# Patient Record
Sex: FEMALE | Race: WHITE | NOT HISPANIC OR LATINO
[De-identification: names, ages, dates, MRNs, and addresses within clinical notes are randomized per-mention and may not be internally consistent; named-entity substitution may affect disease eponyms.]

---

## 2018-10-02 PROBLEM — Z00.00 ENCOUNTER FOR PREVENTIVE HEALTH EXAMINATION: Status: ACTIVE | Noted: 2018-10-02

## 2018-10-03 ENCOUNTER — APPOINTMENT (OUTPATIENT)
Dept: GYNECOLOGIC ONCOLOGY | Facility: CLINIC | Age: 33
End: 2018-10-03
Payer: COMMERCIAL

## 2018-10-03 DIAGNOSIS — Z78.9 OTHER SPECIFIED HEALTH STATUS: ICD-10-CM

## 2018-10-03 DIAGNOSIS — J84.01 ALVEOLAR PROTEINOSIS: ICD-10-CM

## 2018-10-03 PROCEDURE — 93976 VASCULAR STUDY: CPT | Mod: 59

## 2018-10-03 PROCEDURE — 99245 OFF/OP CONSLTJ NEW/EST HI 55: CPT | Mod: 25

## 2018-10-03 PROCEDURE — 76830 TRANSVAGINAL US NON-OB: CPT | Mod: 59

## 2018-10-03 PROCEDURE — 76857 US EXAM PELVIC LIMITED: CPT | Mod: 59

## 2018-10-03 RX ORDER — NORETHINDRONE AND ETHINYL ESTRADIOL 0.4-0.035
KIT ORAL
Refills: 0 | Status: ACTIVE | COMMUNITY

## 2018-10-04 ENCOUNTER — OTHER (OUTPATIENT)
Age: 33
End: 2018-10-04

## 2018-10-07 ENCOUNTER — OUTPATIENT (OUTPATIENT)
Dept: OUTPATIENT SERVICES | Facility: HOSPITAL | Age: 33
LOS: 1 days | End: 2018-10-07
Payer: COMMERCIAL

## 2018-10-07 ENCOUNTER — FORM ENCOUNTER (OUTPATIENT)
Age: 33
End: 2018-10-07

## 2018-10-07 ENCOUNTER — APPOINTMENT (OUTPATIENT)
Dept: MRI IMAGING | Facility: CLINIC | Age: 33
End: 2018-10-07
Payer: COMMERCIAL

## 2018-10-07 DIAGNOSIS — N83.209 UNSPECIFIED OVARIAN CYST, UNSPECIFIED SIDE: ICD-10-CM

## 2018-10-08 PROCEDURE — 72197 MRI PELVIS W/O & W/DYE: CPT | Mod: 26

## 2018-10-08 PROCEDURE — 72197 MRI PELVIS W/O & W/DYE: CPT

## 2018-10-08 PROCEDURE — A9585: CPT

## 2018-10-17 ENCOUNTER — APPOINTMENT (OUTPATIENT)
Dept: GYNECOLOGIC ONCOLOGY | Facility: CLINIC | Age: 33
End: 2018-10-17
Payer: COMMERCIAL

## 2018-10-17 PROCEDURE — 99214 OFFICE O/P EST MOD 30 MIN: CPT

## 2018-11-06 ENCOUNTER — INPATIENT (INPATIENT)
Facility: HOSPITAL | Age: 33
LOS: 0 days | Discharge: HOME | DRG: 743 | End: 2018-11-06
Attending: OBSTETRICS & GYNECOLOGY | Admitting: OBSTETRICS & GYNECOLOGY
Payer: COMMERCIAL

## 2018-11-06 ENCOUNTER — TRANSCRIPTION ENCOUNTER (OUTPATIENT)
Age: 33
End: 2018-11-06

## 2018-11-06 ENCOUNTER — RESULT REVIEW (OUTPATIENT)
Age: 33
End: 2018-11-06

## 2018-11-06 VITALS
RESPIRATION RATE: 16 BRPM | DIASTOLIC BLOOD PRESSURE: 80 MMHG | OXYGEN SATURATION: 100 % | SYSTOLIC BLOOD PRESSURE: 111 MMHG | HEART RATE: 66 BPM

## 2018-11-06 VITALS — HEIGHT: 63 IN | WEIGHT: 134.92 LBS

## 2018-11-06 DIAGNOSIS — N83.201 UNSPECIFIED OVARIAN CYST, RIGHT SIDE: ICD-10-CM

## 2018-11-06 DIAGNOSIS — R10.2 PELVIC AND PERINEAL PAIN: ICD-10-CM

## 2018-11-06 LAB
ANION GAP SERPL CALC-SCNC: 11 MMOL/L — SIGNIFICANT CHANGE UP (ref 5–17)
APTT BLD: 30.9 SEC — SIGNIFICANT CHANGE UP (ref 27.5–36.3)
BLD GP AB SCN SERPL QL: SIGNIFICANT CHANGE UP
BUN SERPL-MCNC: 8 MG/DL — SIGNIFICANT CHANGE UP (ref 8–20)
CALCIUM SERPL-MCNC: 8.5 MG/DL — LOW (ref 8.6–10.2)
CHLORIDE SERPL-SCNC: 101 MMOL/L — SIGNIFICANT CHANGE UP (ref 98–107)
CO2 SERPL-SCNC: 25 MMOL/L — SIGNIFICANT CHANGE UP (ref 22–29)
CREAT SERPL-MCNC: 0.59 MG/DL — SIGNIFICANT CHANGE UP (ref 0.5–1.3)
GLUCOSE SERPL-MCNC: 82 MG/DL — SIGNIFICANT CHANGE UP (ref 70–115)
HCG SERPL-ACNC: <4 MIU/ML — SIGNIFICANT CHANGE UP
HCT VFR BLD CALC: 42.7 % — SIGNIFICANT CHANGE UP (ref 37–47)
HGB BLD-MCNC: 14.2 G/DL — SIGNIFICANT CHANGE UP (ref 12–16)
INR BLD: 0.98 RATIO — SIGNIFICANT CHANGE UP (ref 0.88–1.16)
MCHC RBC-ENTMCNC: 31.1 PG — HIGH (ref 27–31)
MCHC RBC-ENTMCNC: 33.3 G/DL — SIGNIFICANT CHANGE UP (ref 32–36)
MCV RBC AUTO: 93.4 FL — SIGNIFICANT CHANGE UP (ref 81–99)
PLATELET # BLD AUTO: 257 K/UL — SIGNIFICANT CHANGE UP (ref 150–400)
POTASSIUM SERPL-MCNC: 4 MMOL/L — SIGNIFICANT CHANGE UP (ref 3.5–5.3)
POTASSIUM SERPL-SCNC: 4 MMOL/L — SIGNIFICANT CHANGE UP (ref 3.5–5.3)
PROTHROM AB SERPL-ACNC: 11.3 SEC — SIGNIFICANT CHANGE UP (ref 10–12.9)
RBC # BLD: 4.57 M/UL — SIGNIFICANT CHANGE UP (ref 4.4–5.2)
RBC # FLD: 13.5 % — SIGNIFICANT CHANGE UP (ref 11–15.6)
SODIUM SERPL-SCNC: 137 MMOL/L — SIGNIFICANT CHANGE UP (ref 135–145)
TYPE + AB SCN PNL BLD: SIGNIFICANT CHANGE UP
WBC # BLD: 6.6 K/UL — SIGNIFICANT CHANGE UP (ref 4.8–10.8)
WBC # FLD AUTO: 6.6 K/UL — SIGNIFICANT CHANGE UP (ref 4.8–10.8)

## 2018-11-06 PROCEDURE — 85730 THROMBOPLASTIN TIME PARTIAL: CPT

## 2018-11-06 PROCEDURE — 84702 CHORIONIC GONADOTROPIN TEST: CPT

## 2018-11-06 PROCEDURE — 58662 LAPAROSCOPY EXCISE LESIONS: CPT | Mod: 80

## 2018-11-06 PROCEDURE — 86901 BLOOD TYPING SEROLOGIC RH(D): CPT

## 2018-11-06 PROCEDURE — 88307 TISSUE EXAM BY PATHOLOGIST: CPT | Mod: 26

## 2018-11-06 PROCEDURE — 58662 LAPAROSCOPY EXCISE LESIONS: CPT

## 2018-11-06 PROCEDURE — 86900 BLOOD TYPING SEROLOGIC ABO: CPT

## 2018-11-06 PROCEDURE — 85610 PROTHROMBIN TIME: CPT

## 2018-11-06 PROCEDURE — 99284 EMERGENCY DEPT VISIT MOD MDM: CPT | Mod: 25

## 2018-11-06 PROCEDURE — 80048 BASIC METABOLIC PNL TOTAL CA: CPT

## 2018-11-06 PROCEDURE — 85027 COMPLETE CBC AUTOMATED: CPT

## 2018-11-06 PROCEDURE — 86850 RBC ANTIBODY SCREEN: CPT

## 2018-11-06 PROCEDURE — 99285 EMERGENCY DEPT VISIT HI MDM: CPT

## 2018-11-06 PROCEDURE — 36415 COLL VENOUS BLD VENIPUNCTURE: CPT

## 2018-11-06 RX ORDER — CEFAZOLIN SODIUM 1 G
2000 VIAL (EA) INJECTION ONCE
Qty: 0 | Refills: 0 | Status: DISCONTINUED | OUTPATIENT
Start: 2018-11-06 | End: 2018-11-06

## 2018-11-06 RX ORDER — OXYCODONE AND ACETAMINOPHEN 5; 325 MG/1; MG/1
1 TABLET ORAL EVERY 4 HOURS
Qty: 0 | Refills: 0 | Status: DISCONTINUED | OUTPATIENT
Start: 2018-11-06 | End: 2018-11-06

## 2018-11-06 RX ORDER — SODIUM CHLORIDE 9 MG/ML
1000 INJECTION, SOLUTION INTRAVENOUS
Qty: 0 | Refills: 0 | Status: DISCONTINUED | OUTPATIENT
Start: 2018-11-06 | End: 2018-11-06

## 2018-11-06 RX ORDER — FENTANYL CITRATE 50 UG/ML
50 INJECTION INTRAVENOUS
Qty: 0 | Refills: 0 | Status: DISCONTINUED | OUTPATIENT
Start: 2018-11-06 | End: 2018-11-06

## 2018-11-06 RX ORDER — ONDANSETRON 8 MG/1
4 TABLET, FILM COATED ORAL ONCE
Qty: 0 | Refills: 0 | Status: DISCONTINUED | OUTPATIENT
Start: 2018-11-06 | End: 2018-11-06

## 2018-11-06 RX ADMIN — OXYCODONE AND ACETAMINOPHEN 1 TABLET(S): 5; 325 TABLET ORAL at 14:33

## 2018-11-06 RX ADMIN — FENTANYL CITRATE 50 MICROGRAM(S): 50 INJECTION INTRAVENOUS at 14:33

## 2018-11-06 RX ADMIN — SODIUM CHLORIDE 125 MILLILITER(S): 9 INJECTION, SOLUTION INTRAVENOUS at 09:19

## 2018-11-06 RX ADMIN — FENTANYL CITRATE 50 MICROGRAM(S): 50 INJECTION INTRAVENOUS at 13:43

## 2018-11-06 NOTE — H&P ADULT - HISTORY OF PRESENT ILLNESS
34 yo known to our office for ovarian cysts found on MRI one of which is suspicious for a dermoid. Patient presents to ED today with c/o new onset pelvic pain 10/10. Patient reports pain is severe and sharp in nature and began early this morning requiring visit to ER. Patient denies any radiation. She reports similar pain a few weeks ago but states the pain became unbearable today. Patient denies any fevers, SOB, CP, N/V/D, difficulties with urination, VB and calf pain.

## 2018-11-06 NOTE — H&P ADULT - NSHPLABSRESULTS_GEN_ALL_CORE
OBJECTIVE FINDINGS:    Vital Signs Last 24 Hrs  T(C): 36.9 (06 Nov 2018 06:47), Max: 36.9 (06 Nov 2018 06:47)  T(F): 98.4 (06 Nov 2018 06:47), Max: 98.4 (06 Nov 2018 06:47)  HR: 97 (06 Nov 2018 06:47) (97 - 97)  BP: 132/86 (06 Nov 2018 06:47) (132/86 - 132/86)  BP(mean): --  RR: 18 (06 Nov 2018 06:47) (18 - 18)  SpO2: 98% (06 Nov 2018 06:47) (98% - 98%)

## 2018-11-06 NOTE — H&P ADULT - NSHPREVIEWOFSYSTEMS_GEN_ALL_CORE
REVIEW OF SYSTEMS:    CONSTITUTIONAL: No fever, weight loss, or fatigue  RESPIRATORY: No cough, wheezing, chills or hemoptysis; No shortness of breath  CARDIOVASCULAR: No chest pain, palpitations, dizziness, or leg swelling  GASTROINTESTINAL: No abdominal or epigastric pain. No nausea, vomiting, or hematemesis; No diarrhea or constipation. No melena or hematochezia.  GENITOURINARY: Pelvic pain, No dysuria, frequency, hematuria, or incontinence  NEUROLOGICAL: No headaches, memory loss, loss of strength, numbness, or tremors  SKIN: No itching, burning, rashes, or lesions   LYMPH NODES: No enlarged glands  MUSCULOSKELETAL: No joint pain or swelling; No muscle, back, or extremity pain

## 2018-11-06 NOTE — ED PROVIDER NOTE - PHYSICAL EXAMINATION
Gen: NAD, AOx3  Head: NCAT  HEENT: PERRL, EOMI, oral mucosa moist, normal conjunctiva, neck supple  Lung: CTAB, no respiratory distress  CV: rrr, no murmur, Normal perfusion  Abd: soft, +ttp left lower quadrant without rebound/guarding, ND  MSK: No edema, no visible deformities  Neuro: No focal neurologic deficits  Skin: No rash   Psych: normal affect

## 2018-11-06 NOTE — DISCHARGE NOTE ADULT - PATIENT PORTAL LINK FT
You can access the [facility] Patient Portal, offered by [facility], by registering with the following website: [email or web address]

## 2018-11-06 NOTE — DISCHARGE NOTE ADULT - CARE PLAN
Principal Discharge DX:	Cysts of both ovaries  Goal:	improve pain  Assessment and plan of treatment:	will follow-up in office to review path report.

## 2018-11-06 NOTE — H&P ADULT - PROBLEM SELECTOR PLAN 1
Admit to GYN/ONC for OR  Laparoscopic bilateral ovarian cystectomy  NPO for procedure  Percocet and naproxen prn for pain control  IV insert, IVF  stat labs, serum hcg  SCD's for DVT prophylaxis  Ancef 2g pre-op  Plan per Dr. Petit

## 2018-11-06 NOTE — ED PROVIDER NOTE - OBJECTIVE STATEMENT
32yo F with h/o ovarian cysts and 'ovarian sx' in past with known b/l cysts Lt larger than rt, worsening pain since last night. no fever +chills. no dysuria. pain intermttient. 7/10 at [facility]. nonradiating. pain now left lower quadrant and mild rt flank pain.

## 2018-11-06 NOTE — ED PROVIDER NOTE - NS ED ROS FT
ROS: no CP/SOB. no cough. no fever. no n/v/d/c. no abd pain. no rash. no bleeding. no urinary complaints. no weakness. no vision changes. no HA. +back pain. no extremity swelling/deformity. +change in mental status.

## 2018-11-06 NOTE — H&P ADULT - NSHPPHYSICALEXAM_GEN_ALL_CORE
PHYSICAL EXAM:    GENERAL: NAD, well-developed  NERVOUS SYSTEM:  Alert & Oriented X3, Good concentration  CHEST/LUNG: Clear to percussion bilaterally; No rales, rhonchi, wheezing, or rubs  HEART: Regular rate and rhythm; No murmurs, rubs, or gallops  ABDOMEN: Tender to light palpation in all four quadrants, Soft, Nondistended; Bowel sounds present, No rebound, No guarding  EXTREMITIES:  2+ Peripheral Pulses, No clubbing, cyanosis, or edema, Justice's sign negative  LYMPH: No lymphadenopathy noted  SKIN: No rashes or lesions  PELVIC: Deferred

## 2018-11-06 NOTE — DISCHARGE NOTE ADULT - HOSPITAL COURSE
34yo known to our service for ovarian cysts presented with severe pelvic pain. Patient is now s/p laparoscopic bilateral ovarian cystectomy. She will be discharged home per ASU criteria with follow-up in the office. Scripts for pain contorl sent. VS stable for discharge.

## 2018-11-06 NOTE — DISCHARGE NOTE ADULT - MEDICATION SUMMARY - MEDICATIONS TO TAKE
I will START or STAY ON the medications listed below when I get home from the hospital:    oxyCODONE-acetaminophen 5 mg-325 mg oral tablet  -- 1 tab(s) by mouth every 6 hours, As Needed -Moderate Pain (4 - 6) - for severe pain MDD:4 tablets  -- Indication: For Pelvic pain    naproxen 500 mg oral tablet  -- 1 tab(s) by mouth 2 times a day  -- Indication: For inflammation pain

## 2018-11-06 NOTE — ED ADULT NURSE NOTE - OBJECTIVE STATEMENT
c/o abd pain, per pt having ovarian pain on and off, this morning it was unbearable.  Dr. Petit sent me here.n

## 2018-11-08 LAB — SURGICAL PATHOLOGY FINAL REPORT - CH: SIGNIFICANT CHANGE UP

## 2018-11-21 ENCOUNTER — APPOINTMENT (OUTPATIENT)
Dept: GYNECOLOGIC ONCOLOGY | Facility: CLINIC | Age: 33
End: 2018-11-21
Payer: COMMERCIAL

## 2018-11-21 VITALS
WEIGHT: 139 LBS | RESPIRATION RATE: 16 BRPM | BODY MASS INDEX: 24.63 KG/M2 | HEIGHT: 63 IN | DIASTOLIC BLOOD PRESSURE: 70 MMHG | SYSTOLIC BLOOD PRESSURE: 124 MMHG | HEART RATE: 91 BPM | OXYGEN SATURATION: 96 %

## 2018-11-21 PROCEDURE — 99024 POSTOP FOLLOW-UP VISIT: CPT

## 2019-06-21 ENCOUNTER — APPOINTMENT (OUTPATIENT)
Dept: GYNECOLOGIC ONCOLOGY | Facility: CLINIC | Age: 34
End: 2019-06-21

## 2019-07-22 ENCOUNTER — APPOINTMENT (OUTPATIENT)
Dept: GYNECOLOGIC ONCOLOGY | Facility: CLINIC | Age: 34
End: 2019-07-22
Payer: COMMERCIAL

## 2019-07-22 PROCEDURE — 99214 OFFICE O/P EST MOD 30 MIN: CPT | Mod: 25

## 2019-07-22 PROCEDURE — 76857 US EXAM PELVIC LIMITED: CPT | Mod: 59

## 2019-07-22 PROCEDURE — 76830 TRANSVAGINAL US NON-OB: CPT | Mod: 59

## 2019-07-23 ENCOUNTER — OTHER (OUTPATIENT)
Age: 34
End: 2019-07-23

## 2019-07-25 ENCOUNTER — OTHER (OUTPATIENT)
Age: 34
End: 2019-07-25

## 2019-07-30 NOTE — HISTORY OF PRESENT ILLNESS
[FreeTextEntry1] : 33 y/o with history of recurrent dermoids, last resection 11/2018 by me for bilateral dermoids was discharged from my practice. She returns today after recently seeing Dr. Ruth her gyn and having a f/u ultrasound. US from 6/17 showed a left septated cyst measuring 8.0x4.4x6.0cm, when compared to 05/2019 appears to have grown (5/2019-6.5x4.5x7.0cm). She returns today for an US and to discuss management.

## 2019-07-30 NOTE — ASSESSMENT
[FreeTextEntry1] : Ultrasound today showed an 8 cm left septated cyst \par \par I discussed the possibility of requiring a repeat resection if MRI shows this is a dermoid.

## 2019-07-30 NOTE — REASON FOR VISIT
[FreeTextEntry1] : [facility]\par \par [facility] [phone] at [address] [place] [id number]\par

## 2019-07-30 NOTE — PHYSICAL EXAM
[Normal] : No focal neurologic defects observed [de-identified] : Kylee Mccullough MA was present the entire time of results and discussion

## 2019-07-30 NOTE — END OF VISIT
[FreeTextEntry3] : Written by Kylee Mccullough, acting as a scribe for Dr. Brody Petit.\par This note accurately reflects the work and decision made by me.\par

## 2019-08-10 ENCOUNTER — APPOINTMENT (OUTPATIENT)
Dept: GYNECOLOGIC ONCOLOGY | Facility: CLINIC | Age: 34
End: 2019-08-10
Payer: COMMERCIAL

## 2019-08-10 PROCEDURE — 99214 OFFICE O/P EST MOD 30 MIN: CPT

## 2019-08-13 NOTE — PHYSICAL EXAM
[Normal] : No focal neurologic defects observed [de-identified] : Kylee Mccullough MA was present the entire time of results and discussion

## 2019-08-13 NOTE — ASSESSMENT
[FreeTextEntry1] : Discussed with patient that the cyst does not appear to have obvious dermoid appearance on MRI. I would like to observe for now. If cyst continues to grow then will discuss surgical intervention.

## 2019-08-13 NOTE — HISTORY OF PRESENT ILLNESS
[FreeTextEntry1] : 33 y/o with history of recurrent dermoids, last resection 11/2018 by me for bilateral dermoids was discharged from my practice. She was referred back to my office by Dr. Ruth for evaluation of  left septated cyst. I ordered a MRI and asked her to return today to review. \par \par MRI from 8/3 showed a large mildly complex cyst in the left adnexa measuring 7.8 x 6 cm on transaxial imaging by 6.1 cm in craniocaudal dimensions within the internal septa but no nodularity. \par \par Patient states she is symptomatic, but is not in terrible pain or discomfort. Her quality of life is not being altered.

## 2019-08-13 NOTE — REASON FOR VISIT
[FreeTextEntry1] : [facility]\par \par [facility] [phone] at [address] [place] [phone]\par

## 2019-10-03 ENCOUNTER — APPOINTMENT (OUTPATIENT)
Dept: GYNECOLOGIC ONCOLOGY | Facility: CLINIC | Age: 34
End: 2019-10-03
Payer: COMMERCIAL

## 2019-10-03 VITALS
HEIGHT: 63 IN | BODY MASS INDEX: 24.27 KG/M2 | RESPIRATION RATE: 16 BRPM | WEIGHT: 137 LBS | HEART RATE: 75 BPM | OXYGEN SATURATION: 98 %

## 2019-10-03 PROCEDURE — 76830 TRANSVAGINAL US NON-OB: CPT | Mod: 59

## 2019-10-03 PROCEDURE — 99214 OFFICE O/P EST MOD 30 MIN: CPT | Mod: 25

## 2019-10-03 PROCEDURE — 76857 US EXAM PELVIC LIMITED: CPT | Mod: 59

## 2019-10-21 NOTE — PHYSICAL EXAM
[Normal] : No focal neurologic defects observed [de-identified] : Kylee Mccullough MA was present the entire time of ultrasound results and discussion

## 2019-10-21 NOTE — HISTORY OF PRESENT ILLNESS
[FreeTextEntry1] : 33 y/o with a history of recurrent dermoid, requiring prior resection in 11/2018 returns today for a follow up ultrasound. Patient’s last MRI from 8/3/19 revealed a large mildly complex cyst in the left adnexa measuring 7.8x6cm.  Patient admits to persistent pelvic pain, bilaterally.

## 2019-10-21 NOTE — END OF VISIT
[FreeTextEntry3] : Written by Kylee Mccullough, acting as a scribe for Dr. Brody Petit.\par This note accurately reflects the work and decisions made by me\par 
cough

## 2019-10-21 NOTE — ASSESSMENT
[FreeTextEntry1] : Ultrasound from today showed a stable left cyst measuring 8 cm.\par \par I discussed at length with the patient the nature, purpose, risks, benefits, and alternatives of laparoscopic left ovarian cystectomy..  The patient understands the risks to include (but not be limited to) bowel injury, bleeding (with the possible need for transfusion), bladder or ureteral injury, infections, deep venous thrombosis, and kallie-operative death.  The patient also understands that her surgery may not be able to be performed laparoscopically and that she may need a laparotomy.  She also understands the limitations of laparoscopic surgery and the possibility of missing a surgical complication with need for subsequent re-exploration.   She understands the need for a pre-operative bowel preparation and agrees to comply with our instructions.  She agrees to proceed.  She asked numerous questions which were answered to her satisfaction.

## 2019-10-21 NOTE — REASON FOR VISIT
[FreeTextEntry1] : [facility]\par \par [facility] [phone] at [address] [place] [phone]\par

## 2019-11-06 ENCOUNTER — OUTPATIENT (OUTPATIENT)
Dept: OUTPATIENT SERVICES | Facility: HOSPITAL | Age: 34
LOS: 1 days | End: 2019-11-06
Payer: COMMERCIAL

## 2019-11-06 VITALS
HEART RATE: 88 BPM | DIASTOLIC BLOOD PRESSURE: 79 MMHG | WEIGHT: 143.3 LBS | RESPIRATION RATE: 16 BRPM | TEMPERATURE: 98 F | HEIGHT: 63 IN | SYSTOLIC BLOOD PRESSURE: 110 MMHG

## 2019-11-06 DIAGNOSIS — Z29.9 ENCOUNTER FOR PROPHYLACTIC MEASURES, UNSPECIFIED: ICD-10-CM

## 2019-11-06 DIAGNOSIS — N83.209 UNSPECIFIED OVARIAN CYST, UNSPECIFIED SIDE: ICD-10-CM

## 2019-11-06 DIAGNOSIS — Z98.890 OTHER SPECIFIED POSTPROCEDURAL STATES: Chronic | ICD-10-CM

## 2019-11-06 DIAGNOSIS — Z01.818 ENCOUNTER FOR OTHER PREPROCEDURAL EXAMINATION: ICD-10-CM

## 2019-11-06 LAB
ANION GAP SERPL CALC-SCNC: 13 MMOL/L — SIGNIFICANT CHANGE UP (ref 5–17)
BASOPHILS # BLD AUTO: 0.03 K/UL — SIGNIFICANT CHANGE UP (ref 0–0.2)
BASOPHILS NFR BLD AUTO: 0.6 % — SIGNIFICANT CHANGE UP (ref 0–2)
BUN SERPL-MCNC: 7 MG/DL — LOW (ref 8–20)
CALCIUM SERPL-MCNC: 8.7 MG/DL — SIGNIFICANT CHANGE UP (ref 8.6–10.2)
CHLORIDE SERPL-SCNC: 105 MMOL/L — SIGNIFICANT CHANGE UP (ref 98–107)
CO2 SERPL-SCNC: 22 MMOL/L — SIGNIFICANT CHANGE UP (ref 22–29)
CREAT SERPL-MCNC: 0.57 MG/DL — SIGNIFICANT CHANGE UP (ref 0.5–1.3)
EOSINOPHIL # BLD AUTO: 0.12 K/UL — SIGNIFICANT CHANGE UP (ref 0–0.5)
EOSINOPHIL NFR BLD AUTO: 2.3 % — SIGNIFICANT CHANGE UP (ref 0–6)
GLUCOSE SERPL-MCNC: 98 MG/DL — SIGNIFICANT CHANGE UP (ref 70–115)
HBA1C BLD-MCNC: 5.2 % — SIGNIFICANT CHANGE UP (ref 4–5.6)
HCG SERPL-ACNC: <4 MIU/ML — SIGNIFICANT CHANGE UP
HCT VFR BLD CALC: 43.5 % — SIGNIFICANT CHANGE UP (ref 34.5–45)
HGB BLD-MCNC: 14.5 G/DL — SIGNIFICANT CHANGE UP (ref 11.5–15.5)
IMM GRANULOCYTES NFR BLD AUTO: 0.8 % — SIGNIFICANT CHANGE UP (ref 0–1.5)
LYMPHOCYTES # BLD AUTO: 1.87 K/UL — SIGNIFICANT CHANGE UP (ref 1–3.3)
LYMPHOCYTES # BLD AUTO: 35.2 % — SIGNIFICANT CHANGE UP (ref 13–44)
MCHC RBC-ENTMCNC: 31 PG — SIGNIFICANT CHANGE UP (ref 27–34)
MCHC RBC-ENTMCNC: 33.3 GM/DL — SIGNIFICANT CHANGE UP (ref 32–36)
MCV RBC AUTO: 92.9 FL — SIGNIFICANT CHANGE UP (ref 80–100)
MONOCYTES # BLD AUTO: 0.41 K/UL — SIGNIFICANT CHANGE UP (ref 0–0.9)
MONOCYTES NFR BLD AUTO: 7.7 % — SIGNIFICANT CHANGE UP (ref 2–14)
NEUTROPHILS # BLD AUTO: 2.85 K/UL — SIGNIFICANT CHANGE UP (ref 1.8–7.4)
NEUTROPHILS NFR BLD AUTO: 53.4 % — SIGNIFICANT CHANGE UP (ref 43–77)
PLATELET # BLD AUTO: 305 K/UL — SIGNIFICANT CHANGE UP (ref 150–400)
POTASSIUM SERPL-MCNC: 4 MMOL/L — SIGNIFICANT CHANGE UP (ref 3.5–5.3)
POTASSIUM SERPL-SCNC: 4 MMOL/L — SIGNIFICANT CHANGE UP (ref 3.5–5.3)
RBC # BLD: 4.68 M/UL — SIGNIFICANT CHANGE UP (ref 3.8–5.2)
RBC # FLD: 13 % — SIGNIFICANT CHANGE UP (ref 10.3–14.5)
SODIUM SERPL-SCNC: 140 MMOL/L — SIGNIFICANT CHANGE UP (ref 135–145)
WBC # BLD: 5.32 K/UL — SIGNIFICANT CHANGE UP (ref 3.8–10.5)
WBC # FLD AUTO: 5.32 K/UL — SIGNIFICANT CHANGE UP (ref 3.8–10.5)

## 2019-11-06 PROCEDURE — G0463: CPT

## 2019-11-06 PROCEDURE — 83036 HEMOGLOBIN GLYCOSYLATED A1C: CPT

## 2019-11-06 PROCEDURE — 80048 BASIC METABOLIC PNL TOTAL CA: CPT

## 2019-11-06 PROCEDURE — 84702 CHORIONIC GONADOTROPIN TEST: CPT

## 2019-11-06 PROCEDURE — 85027 COMPLETE CBC AUTOMATED: CPT

## 2019-11-06 PROCEDURE — 36415 COLL VENOUS BLD VENIPUNCTURE: CPT

## 2019-11-06 NOTE — H&P PST ADULT - ASSESSMENT
34 year old female in NAD with history of ovarian cysts presents for removal ov left ovarian cyst .   OPIOID RISK TOOL    CORINA EACH BOX THAT APPLIES AND ADD TOTALS AT THE END    FAMILY HISTORY OF SUBSTANCE ABUSE                 FEMALE         MALE                                                Alcohol                             [  ]1 pt          [  ]3pts                                               Illegal Durgs                     [  ]2 pts        [  ]3pts                                               Rx Drugs                           [  ]4 pts        [  ]4 pts    PERSONAL HISTORY OF SUBSTANCE ABUSE                                                                                          Alcohol                             [  ]3 pts       [  ]3 pts                                               Illegal Durgs                     [  ]X4 pts        [  ]4 pts                                               Rx Drugs                           [  ]5 pts        [  ]5 pts    AGE BETWEEN 16-45 YEARS                                      [X  ]1 pt         [  ]1 pt    HISTORY OF PREADOLESCENT   SEXUAL ABUSE                                                             [  ]3 pts        [  ]0pts    PSYCHOLOGICAL DISEASE                     ADD, OCD, Bipolar, Schizophrenia        [  ]2 pts         [  ]2 pts                      Depression                                               [  ]1 pt           [  ]1 pt           SCORING TOTAL   (add numbers and type here)              (*5**)                                     A score of 3 or lower indicated LOW risk for future opiod abuse  A score of 4 to 7 indicated moderate risk for future opiod abuse  A score of 8 or higher indicates a high risk for opiod abuse  CAPRINI VTE 2.0 SCORE [CLOT updated 2019]    AGE RELATED RISK FACTORS                                                       MOBILITY RELATED FACTORS  [ ] Age 41-60 years                                            (1 Point)                    [ ] Bed rest                                                        (1 Point)  [ ] Age: 61-74 years                                           (2 Points)                  [ ] Plaster cast                                                   (2 Points)  [ ] Age= 75 years                                              (3 Points)                    [ ] Bed bound for more than 72 hours                 (2 Points)    DISEASE RELATED RISK FACTORS                                               GENDER SPECIFIC FACTORS  [ ] Edema in the lower extremities                       (1 Point)              [ ] Pregnancy                                                     (1 Point)  [ ] Varicose veins                                               (1 Point)                     [ ] Post-partum < 6 weeks                                   (1 Point)             [ ] BMI > 25 Kg/m2                                            (1 Point)                     [ ] Hormonal therapy  or oral contraception          (1 Point)                 [ ] Sepsis (in the previous month)                        (1 Point)               [ ] History of pregnancy complications                 (1 point)  [ ] Pneumonia or serious lung disease                                               [ ] Unexplained or recurrent                     (1 Point)           (in the previous month)                               (1 Point)  [ ] Abnormal pulmonary function test                     (1 Point)                 SURGERY RELATED RISK FACTORS  [ ] Acute myocardial infarction                              (1 Point)               [ ]  Section                                             (1 Point)  [ ] Congestive heart failure (in the previous month)  (1 Point)      [ ] Minor surgery                                                  (1 Point)   [ ] Inflammatory bowel disease                             (1 Point)               [ ] Arthroscopic surgery                                        (2 Points)  [ ] Central venous access                                      (2 Points)                [ X] General surgery lasting more than 45 minutes (2 points)  [ ] Malignancy- Present or previous                   (2 Points)                [ ] Elective arthroplasty                                         (5 points)    [ ] Stroke (in the previous month)                          (5 Points)                                                                                                                                                           HEMATOLOGY RELATED FACTORS                                                 TRAUMA RELATED RISK FACTORS  [ ] Prior episodes of VTE                                     (3 Points)                [ ] Fracture of the hip, pelvis, or leg                       (5 Points)  [ ] Positive family history for VTE                         (3 Points)             [ ] Acute spinal cord injury (in the previous month)  (5 Points)  [ ] Prothrombin 32718 A                                     (3 Points)               [ ] Paralysis  (less than 1 month)                             (5 Points)  [ ] Factor V Leiden                                             (3 Points)                  [ ] Multiple Trauma within 1 month                        (5 Points)  [ ] Lupus anticoagulants                                     (3 Points)                                                           [ ] Anticardiolipin antibodies                               (3 Points)                                                       [ ] High homocysteine in the blood                      (3 Points)                                             [ ] Other congenital or acquired thrombophilia      (3 Points)                                                [ ] Heparin induced thrombocytopenia                  (3 Points)                                     Total Score [      2    ]

## 2019-11-06 NOTE — H&P PST ADULT - HISTORY OF PRESENT ILLNESS
34 yr old female in NAD presents with  history of ovarian cysts . Pt had surgery   and 2018 to remove cysts. Pt has left lower abdominal pain that radiates to right .8  . Left cyst has become larger over past few months.  LMP 19 . Started BCP  in 2018 s/p surgery.

## 2019-11-06 NOTE — H&P PST ADULT - NSICDXPROBLEM_GEN_ALL_CORE_FT
PROBLEM DIAGNOSES  Problem: Need for prophylactic measure  Assessment and Plan: caprinin score 2     Problem: Ovarian cyst  Assessment and Plan: laparoscopic  left ovarian  cystectomy  with DR. Jose tuttle

## 2019-11-06 NOTE — H&P PST ADULT - NSICDXPASTSURGICALHX_GEN_ALL_CORE_FT
PAST SURGICAL HISTORY:  H/O ovarian cystectomy 2009,2018    History of augmentation of both breasts implants

## 2019-11-06 NOTE — H&P PST ADULT - NSANTHOSAYNRD_GEN_A_CORE
No. LUIS E screening performed.  STOP BANG Legend: 0-2 = LOW Risk; 3-4 = INTERMEDIATE Risk; 5-8 = HIGH Risk

## 2019-11-11 ENCOUNTER — FORM ENCOUNTER (OUTPATIENT)
Age: 34
End: 2019-11-11

## 2019-11-12 ENCOUNTER — RESULT REVIEW (OUTPATIENT)
Age: 34
End: 2019-11-12

## 2019-11-12 ENCOUNTER — OUTPATIENT (OUTPATIENT)
Dept: OUTPATIENT SERVICES | Facility: HOSPITAL | Age: 34
LOS: 1 days | End: 2019-11-12
Payer: COMMERCIAL

## 2019-11-12 VITALS
DIASTOLIC BLOOD PRESSURE: 81 MMHG | SYSTOLIC BLOOD PRESSURE: 118 MMHG | TEMPERATURE: 99 F | RESPIRATION RATE: 20 BRPM | HEART RATE: 88 BPM | OXYGEN SATURATION: 97 %

## 2019-11-12 VITALS — HEIGHT: 63 IN | WEIGHT: 138.89 LBS

## 2019-11-12 DIAGNOSIS — Z98.890 OTHER SPECIFIED POSTPROCEDURAL STATES: Chronic | ICD-10-CM

## 2019-11-12 DIAGNOSIS — N83.299 OTHER OVARIAN CYST, UNSPECIFIED SIDE: ICD-10-CM

## 2019-11-12 PROCEDURE — 88305 TISSUE EXAM BY PATHOLOGIST: CPT | Mod: 26

## 2019-11-12 PROCEDURE — 58662 LAPAROSCOPY EXCISE LESIONS: CPT

## 2019-11-12 PROCEDURE — 58662 LAPAROSCOPY EXCISE LESIONS: CPT | Mod: LT

## 2019-11-12 PROCEDURE — 88305 TISSUE EXAM BY PATHOLOGIST: CPT

## 2019-11-12 RX ORDER — NORETHINDRONE AND ETHINYL ESTRADIOL 0.4-0.035
1 KIT ORAL
Qty: 0 | Refills: 0 | DISCHARGE

## 2019-11-12 RX ORDER — SODIUM CHLORIDE 9 MG/ML
1000 INJECTION, SOLUTION INTRAVENOUS
Refills: 0 | Status: DISCONTINUED | OUTPATIENT
Start: 2019-11-12 | End: 2019-11-12

## 2019-11-12 RX ORDER — ACETAMINOPHEN 500 MG
2 TABLET ORAL
Qty: 0 | Refills: 0 | DISCHARGE

## 2019-11-12 RX ORDER — SODIUM CHLORIDE 9 MG/ML
3 INJECTION INTRAMUSCULAR; INTRAVENOUS; SUBCUTANEOUS ONCE
Refills: 0 | Status: DISCONTINUED | OUTPATIENT
Start: 2019-11-12 | End: 2019-11-12

## 2019-11-12 RX ORDER — FENTANYL CITRATE 50 UG/ML
50 INJECTION INTRAVENOUS
Refills: 0 | Status: DISCONTINUED | OUTPATIENT
Start: 2019-11-12 | End: 2019-11-12

## 2019-11-12 RX ORDER — ONDANSETRON 8 MG/1
4 TABLET, FILM COATED ORAL ONCE
Refills: 0 | Status: DISCONTINUED | OUTPATIENT
Start: 2019-11-12 | End: 2019-11-12

## 2019-11-12 RX ORDER — CEFAZOLIN SODIUM 1 G
2000 VIAL (EA) INJECTION ONCE
Refills: 0 | Status: DISCONTINUED | OUTPATIENT
Start: 2019-11-12 | End: 2019-11-12

## 2019-11-12 RX ORDER — CEFAZOLIN SODIUM 1 G
2000 VIAL (EA) INJECTION ONCE
Refills: 0 | Status: DISCONTINUED | OUTPATIENT
Start: 2019-11-12 | End: 2019-11-28

## 2019-11-12 RX ORDER — CEFOTETAN DISODIUM 1 G
2 VIAL (EA) INJECTION ONCE
Refills: 0 | Status: DISCONTINUED | OUTPATIENT
Start: 2019-11-12 | End: 2019-11-12

## 2019-11-12 RX ORDER — OXYCODONE HYDROCHLORIDE 5 MG/1
1 TABLET ORAL
Qty: 12 | Refills: 0
Start: 2019-11-12 | End: 2019-11-14

## 2019-11-12 RX ADMIN — FENTANYL CITRATE 50 MICROGRAM(S): 50 INJECTION INTRAVENOUS at 11:35

## 2019-11-12 RX ADMIN — FENTANYL CITRATE 50 MICROGRAM(S): 50 INJECTION INTRAVENOUS at 11:21

## 2019-11-12 RX ADMIN — FENTANYL CITRATE 50 MICROGRAM(S): 50 INJECTION INTRAVENOUS at 11:15

## 2019-11-12 RX ADMIN — FENTANYL CITRATE 50 MICROGRAM(S): 50 INJECTION INTRAVENOUS at 11:26

## 2019-11-12 NOTE — BRIEF OPERATIVE NOTE - NSICDXBRIEFPOSTOP_GEN_ALL_CORE_FT
POST-OP DIAGNOSIS:  Endometriosis 12-Nov-2019 11:04:09  Brody Livingston  Cyst, ovary, dermoid, left 12-Nov-2019 11:03:59  Brody Livingston

## 2019-11-12 NOTE — BRIEF OPERATIVE NOTE - OPERATION/FINDINGS
Left ovarian cyst approx 8cm, mostly simple cyst, dermoid 1cm. Right endometrial implant in pouch of vida

## 2019-11-12 NOTE — BRIEF OPERATIVE NOTE - NSICDXBRIEFPROCEDURE_GEN_ALL_CORE_FT
PROCEDURES:  Fulguration of endometriosis 12-Nov-2019 11:03:05  rBody Livingston  Laparoscopic left ovarian cystectomy 12-Nov-2019 11:02:55  Brody Livingston

## 2019-11-12 NOTE — ASU DISCHARGE PLAN (ADULT/PEDIATRIC) - ASU DC SPECIAL INSTRUCTIONSFT
Please Follow-up with Dr. Petit in two weeks to review pathology report/ for post-op visit.    Please take naproxen-1 tablet every 12 hours x 3-4 days, may take percocet as prescribed for breakthrough pain.

## 2019-11-12 NOTE — ASU DISCHARGE PLAN (ADULT/PEDIATRIC) - CARE PROVIDER_API CALL
Brody Petit)  Gynecologic Oncology; Obstetrics and Gynecology  [address] [place] [address]  Phone: [phone]  Fax: [phone]  Follow Up Time:

## 2019-11-14 PROBLEM — N83.209 UNSPECIFIED OVARIAN CYST, UNSPECIFIED SIDE: Chronic | Status: ACTIVE | Noted: 2019-11-06

## 2019-11-18 ENCOUNTER — OTHER (OUTPATIENT)
Age: 34
End: 2019-11-18

## 2019-11-18 LAB — SURGICAL PATHOLOGY STUDY: SIGNIFICANT CHANGE UP

## 2019-11-18 RX ORDER — NAPROXEN 500 MG/1
500 TABLET ORAL
Qty: 6 | Refills: 0 | Status: ACTIVE | COMMUNITY
Start: 2019-11-18 | End: 1900-01-01

## 2019-11-19 ENCOUNTER — APPOINTMENT (OUTPATIENT)
Dept: GYNECOLOGIC ONCOLOGY | Facility: CLINIC | Age: 34
End: 2019-11-19
Payer: COMMERCIAL

## 2019-11-19 VITALS
BODY MASS INDEX: 24.8 KG/M2 | HEIGHT: 63 IN | HEART RATE: 86 BPM | DIASTOLIC BLOOD PRESSURE: 84 MMHG | RESPIRATION RATE: 16 BRPM | OXYGEN SATURATION: 98 % | WEIGHT: 140 LBS | SYSTOLIC BLOOD PRESSURE: 125 MMHG

## 2019-11-19 PROCEDURE — 99024 POSTOP FOLLOW-UP VISIT: CPT

## 2019-11-19 NOTE — ASSESSMENT
[FreeTextEntry1] : Patient post operatively noted to have intermittent pelvic pains as well as increased work of breathing. Discussed with patient that her increased work of breathing I believe is likely related to anxiousness. Discussed she is low risk of DVT/PE and has no other signs and symptoms of anemia and preoperative Hgb was within normal limits. Recommended to continue to take Naprosyn for pelvic pains to decrease inflammation.

## 2019-11-19 NOTE — REASON FOR VISIT
[Post Op] : post op visit [de-identified] : left ovarian cystectomy  [de-identified] : 11/12/19 [de-identified] : Patient presents today for a post operative visit. She reports she has been experiencing increased work of breathing over the past few days as well as intermittent bilateral sharp pelvic pains. Patient is unsure if SOB related to anxiousness, but she does admit this post operative healing has been different than her laparoscopic cystectomies in the past. She reports she is taking Naprosyn for pain control alternating with Tylenol. She is ambulating, tolerating a regular diet, and having normal bowel and bladder habits. She denies chest pain, dizziness, lightheadedness, nausea and vomiting as well as calf pain when ambulating.

## 2019-11-19 NOTE — DISCUSSION/SUMMARY
[Dry] : was dry [Clean] : was clean [Intact] : was intact [Normal Skin] : normal appearance [None] : had no drainage [Clinical Recheck] : clinical recheck [Erythema] : was not erythematous [Ecchymosis] : was not ecchymotic [Seroma] : had no seroma [Firm] : soft [Abnormal Bowel Sounds] : normal bowel sounds [Tender] : nontender [Rebound] : no rebound tenderness [Guarding] : no guarding [Incisional Hernia] : no incisional hernia [Mass] : no palpable mass [de-identified] : Continue regular diet and activity as tolerated. [FreeTextEntry9] : Lung: CTA B/L, no wheezing noted Cardiac: Normal S1/S2, RRR Ext: No bilateral lower ext. edema noted

## 2019-11-20 ENCOUNTER — APPOINTMENT (OUTPATIENT)
Dept: GYNECOLOGIC ONCOLOGY | Facility: CLINIC | Age: 34
End: 2019-11-20
Payer: COMMERCIAL

## 2019-11-20 VITALS
DIASTOLIC BLOOD PRESSURE: 85 MMHG | WEIGHT: 140 LBS | HEIGHT: 63 IN | OXYGEN SATURATION: 100 % | SYSTOLIC BLOOD PRESSURE: 127 MMHG | BODY MASS INDEX: 24.8 KG/M2 | HEART RATE: 116 BPM | RESPIRATION RATE: 16 BRPM

## 2019-11-20 DIAGNOSIS — G89.18 OTHER ACUTE POSTPROCEDURAL PAIN: ICD-10-CM

## 2019-11-20 PROCEDURE — 99024 POSTOP FOLLOW-UP VISIT: CPT

## 2019-11-20 RX ORDER — LEVONORGESTREL AND ETHINYL ESTRADIOL AND ETHINYL ESTRADIOL 150-30(84)
0.15-0.03 &0.01 KIT ORAL DAILY
Qty: 1 | Refills: 3 | Status: ACTIVE | COMMUNITY
Start: 2019-11-20 | End: 1900-01-01

## 2019-11-25 ENCOUNTER — APPOINTMENT (OUTPATIENT)
Dept: GYNECOLOGIC ONCOLOGY | Facility: CLINIC | Age: 34
End: 2019-11-25

## 2019-12-03 ENCOUNTER — OTHER (OUTPATIENT)
Age: 34
End: 2019-12-03

## 2019-12-06 ENCOUNTER — APPOINTMENT (OUTPATIENT)
Dept: GYNECOLOGIC ONCOLOGY | Facility: CLINIC | Age: 34
End: 2019-12-06
Payer: COMMERCIAL

## 2019-12-06 VITALS
SYSTOLIC BLOOD PRESSURE: 123 MMHG | DIASTOLIC BLOOD PRESSURE: 89 MMHG | WEIGHT: 140 LBS | HEART RATE: 95 BPM | OXYGEN SATURATION: 99 % | BODY MASS INDEX: 24.8 KG/M2 | RESPIRATION RATE: 16 BRPM | HEIGHT: 63 IN

## 2019-12-06 PROCEDURE — 99024 POSTOP FOLLOW-UP VISIT: CPT

## 2019-12-06 NOTE — PHYSICAL EXAM
[Normal] : Mood and affect: Normal [de-identified] : Kylee Mccullough MA was present the entire time of gynecological exam

## 2019-12-06 NOTE — ASSESSMENT
[FreeTextEntry1] : Discussed with patient her pain is likely from endometriosis. We discussed option of definitive surgical intervention with a complete hysterectomy and bilateral salpingo-oophrectomy. Given her age and in the absence of her having a family now I am not recommending this option. We discussed medically managing with OCP (patient is currently on seasonique) vs hormone implantation vs Orilissa vs Lupron therapy. We discussed possible side affects from Orlissa including loss of libido and hot flashes, these side affects are similar to those of lurpon. The difference is the side affects from lupron may take longer to resolve than with the Orilissa. Patient wishes to continue to the seaonsique for another 2-3 months. She will return to my office in March for a follow up visit.

## 2019-12-06 NOTE — HISTORY OF PRESENT ILLNESS
[FreeTextEntry1] : 35 y/o is s/p laparoscopic bilateral ovarian cystectomy for bilateral ovarian dermoid cysts on 11/12/19. She was found to have an 8 cm left ovarian dermoid cyst that was removed intact, 1 cm powder burn lesion on the peritoneum or the right cul-de-sac. She was seen for a postoperative visit on 11/2019 and complained of bilateral pelvic pain but overall appeared to heal well without any sings of acute post-operative complication. She contact my PA on 12/03/19 with complaint of persistent bilateral pelvic stabbing pains. Patient started her menstrual cycle at the end of last week and though her symptoms were related to that, but her menstrual cycle has ended and patient continues to have pain.

## 2019-12-06 NOTE — REASON FOR VISIT
[FreeTextEntry1] : [facility]\par \par [facility] [phone] at [address] [place] [phone]\par

## 2019-12-06 NOTE — END OF VISIT
[FreeTextEntry3] : Written by Kylee Mccullough, acting as a scribe for Dr. Brody Petit.\par This note accurately reflects the work and decisions made by me\par

## 2019-12-09 NOTE — DISCUSSION/SUMMARY
[Clean] : was clean [Intact] : was intact [Dry] : was dry [Normal Skin] : normal appearance [None] : had no drainage [No Sign of Infection] : is showing no signs of infection [Excellent Pain Control] : has excellent pain control [Doing Well] : is doing well [Findings] : These findings were discussed with [unfilled] in detail. She understood and accepted the rationale for this recommendation and also understood the serious impact that these findings could have upon her prognosis for survival. [Erythema] : was not erythematous [Ecchymosis] : was not ecchymotic [Seroma] : had no seroma [Firm] : soft [Tender] : nontender [Abnormal Bowel Sounds] : normal bowel sounds [Rebound] : no rebound tenderness [Guarding] : no guarding [Incisional Hernia] : no incisional hernia [Mass] : no palpable mass [Cervical Abnormality] : normal cervix [External Genitalia Abnormal] : normal external genitalia [Vaginal Exam Abnormal] : normal vaginal exam [FreeTextEntry1] : Pertinent findings 8 cm left ovarian dermoid cyst removed intact, 1 cm powder burn lesion on the peritoneum or the right cul-de-sac. No other evidence of endometriosis. Normal uterus, tubes and right ovary. Normal upper abdomen.\par \par Final pathology showed a left mature cystic teratoma. Right cul-de-sac biopsy with fibroadipose tissue.

## 2019-12-09 NOTE — REASON FOR VISIT
[Post Op] : post op visit [de-identified] : 11/12/2019 [de-identified] : laparoscopic bilateral ovarian cystectomy for bilateral ovarian dermoid cysts. Surgery was performed on 11/06/19 at [facility]

## 2019-12-09 NOTE — ASSESSMENT
[FreeTextEntry1] : Patient requested disability extension. I explained that she does not have a surgical complication that would explain why she requires and extension, therefor I am unable to give her one. The pain she is experiencing is likely related to endometriosis. We discussed the option of oral contraceptive continuously without taking placebo. I will refill her Seasonique. Patient will return in three months for a follow up visit. She may return sooner if her symptoms do not improve.

## 2020-03-16 ENCOUNTER — APPOINTMENT (OUTPATIENT)
Dept: GYNECOLOGIC ONCOLOGY | Facility: CLINIC | Age: 35
End: 2020-03-16

## 2020-07-31 ENCOUNTER — APPOINTMENT (OUTPATIENT)
Dept: GYNECOLOGIC ONCOLOGY | Facility: CLINIC | Age: 35
End: 2020-07-31
Payer: COMMERCIAL

## 2020-07-31 DIAGNOSIS — N80.9 ENDOMETRIOSIS, UNSPECIFIED: ICD-10-CM

## 2020-07-31 DIAGNOSIS — N83.209 UNSPECIFIED OVARIAN CYST, UNSPECIFIED SIDE: ICD-10-CM

## 2020-07-31 PROCEDURE — 76830 TRANSVAGINAL US NON-OB: CPT | Mod: 59

## 2020-07-31 PROCEDURE — 93976 VASCULAR STUDY: CPT | Mod: 59

## 2020-07-31 PROCEDURE — 76857 US EXAM PELVIC LIMITED: CPT | Mod: 59

## 2020-07-31 PROCEDURE — 99214 OFFICE O/P EST MOD 30 MIN: CPT | Mod: 25

## 2020-08-11 NOTE — HISTORY OF PRESENT ILLNESS
[FreeTextEntry1] : This 35 year old is s/p laparoscopic bilateral ovarian cystectomy for bilateral ovarian dermoid cysts on 11/12/19. She was found to have an 8 cm left ovarian dermoid cyst that was removed intact, 1 cm powder burn lesion on the peritoneum or the right cul-de-sac. I discussed with patient that her pain was most likely from endometriosis. We discussed option of definitive surgical intervention with a complete hysterectomy and bilateral salpingo-oophrectomy. Given her age and in the absence of her having a family now I recommended against this option. We discussed medically managing with OCP (patient is currently on seasonique) vs hormone implantation vs Orilissa vs Lupron therapy. We discussed possible side effects from Orlissa including loss of libido and hot flashes, these side effects are similar to those of Lupron. The difference is the side effects from Lupron may take longer to resolve than with the Orilissa. Patient wished to continue seasonsique for another 2-3 months. Patient returns to the office today for an overdue follow up ultrasound. Patient admits to pelvic pain and states she has noticed urinary urgency recently within the past few months.

## 2020-08-11 NOTE — PHYSICAL EXAM
[Normal] : No focal neurologic defects observed [de-identified] : Silva Lucas Medical assistant chaperoned during results and discussion

## 2020-08-11 NOTE — REASON FOR VISIT
[FreeTextEntry1] : [place] [facility] \par \par [facility]. [phone]\par [address] [place] [address]

## 2020-08-11 NOTE — ASSESSMENT
[FreeTextEntry1] : Ultrasound performed today revealed UT 7.0 x 3.7 x 4.2cm tilted. Endo 2.0mm. Left ovary 2.7 x 1.6 x 1.7 cm mildly heterogenous. Right ovary 1.9 x 1.1 x 1.6 cm mildy heterogenous. Bilateral ovaries normal doppler. \par \par I discussed with patient that her ultrasound today looks good. I advised her that her pain that she is experiencing is likely due to endometriosis. I advised the patient to follow a Low oxalate diet and follow up in 1 month to discuss how she is feeling. I explained that people with pelvic pains feel relatively better right away on this diet. Patient will continue seasonique.

## 2020-08-11 NOTE — END OF VISIT
[FreeTextEntry3] : Written by Silva Lucas, acting as a scribe for Dr. Brody Petit \par This note accurately reflects the work and decisions made by me.

## 2020-08-28 ENCOUNTER — APPOINTMENT (OUTPATIENT)
Dept: GYNECOLOGIC ONCOLOGY | Facility: CLINIC | Age: 35
End: 2020-08-28

## 2022-04-15 NOTE — ED PROVIDER NOTE - MEDICAL DECISION MAKING DETAILS
Supervision was available patient with known cysts, large on left wrosening pain concern for torsion. Pt spoke with GYN who sent to ED. Will speak with GYN/ONC regarding plan. labs, US. pain control reasses

## 2022-07-18 NOTE — ED ADULT NURSE NOTE - NSHISCREENINGQ1_ED_A_ED
Health Maintenance Due   Topic Date Due   • COVID-19 Vaccine (1) Never done   • Cervical Cancer Screen 30-64 -  02/07/2021       Patient is due for topics as listed above but is not proceeding with Cervical cancer screening at this time. Will see OBGYN.      No

## 2024-05-16 NOTE — ASU PREOP CHECKLIST - ALLERGY BAND ON
1 year appointment reminder message was created and will be sent out 2 - 3 months prior to next appointment due date. [address]   
Is the patient currently in the state of [place]? YES    Visit mode:VIDEO    If the visit is dropped, the patient can be reconnected by: VIDEO VISIT: Send to e-mail at: [email or web address]    Will anyone else be joining the visit? NO  (If patient encounters technical issues they should call [phone]    How would you like to obtain your AVS? MyChart    Are changes needed to the allergy or medication list? Pt stated no changes to allergies and Pt stated no med changes    Are refills needed on medications prescribed by this physician? NO  Has patient had flu shot for current/most recent flu season? If so, when? No    Reason for visit: MARK NAYAKF      
no known allergies